# Patient Record
Sex: FEMALE | Race: WHITE | Employment: FULL TIME | URBAN - METROPOLITAN AREA
[De-identification: names, ages, dates, MRNs, and addresses within clinical notes are randomized per-mention and may not be internally consistent; named-entity substitution may affect disease eponyms.]

---

## 2017-02-21 ENCOUNTER — HOSPITAL ENCOUNTER (OUTPATIENT)
Dept: MAMMOGRAPHY | Age: 47
Discharge: HOME | End: 2017-02-21
Attending: FAMILY MEDICINE
Payer: COMMERCIAL

## 2017-02-21 DIAGNOSIS — Z12.31 VISIT FOR SCREENING MAMMOGRAM: ICD-10-CM

## 2017-02-21 PROCEDURE — 77067 SCR MAMMO BI INCL CAD: CPT

## 2017-05-12 ENCOUNTER — APPOINTMENT (RX ONLY)
Dept: URBAN - METROPOLITAN AREA CLINIC 349 | Facility: CLINIC | Age: 47
Setting detail: DERMATOLOGY
End: 2017-05-12

## 2017-05-12 DIAGNOSIS — B37.2 CANDIDIASIS OF SKIN AND NAIL: ICD-10-CM

## 2017-05-12 DIAGNOSIS — Z79.899 OTHER LONG TERM (CURRENT) DRUG THERAPY: ICD-10-CM

## 2017-05-12 PROCEDURE — ? OTHER

## 2017-05-12 PROCEDURE — ? PRESCRIPTION

## 2017-05-12 PROCEDURE — 99202 OFFICE O/P NEW SF 15 MIN: CPT

## 2017-05-12 PROCEDURE — ? TREATMENT REGIMEN

## 2017-05-12 PROCEDURE — ? COUNSELING

## 2017-05-12 RX ORDER — IODOQUINOL, HYDROCORTISONE ACETATE AND ALOE VERA LEAF 10; 20; 10 MG/G; MG/G; MG/G
GEL TOPICAL
Qty: 1 | Refills: 2 | Status: ERX | COMMUNITY
Start: 2017-05-12

## 2017-05-12 RX ORDER — FLUCONAZOLE 150 MG/1
TABLET ORAL
Qty: 7 | Refills: 0 | Status: ERX | COMMUNITY
Start: 2017-05-12

## 2017-05-12 RX ADMIN — IODOQUINOL, HYDROCORTISONE ACETATE AND ALOE VERA LEAF: 10; 20; 10 GEL TOPICAL at 00:00

## 2017-05-12 RX ADMIN — FLUCONAZOLE: 150 TABLET ORAL at 00:00

## 2017-05-12 ASSESSMENT — LOCATION DETAILED DESCRIPTION DERM
LOCATION DETAILED: LEFT SUPRAPUBIC SKIN
LOCATION DETAILED: MONS PUBIS
LOCATION DETAILED: RIGHT SUPRAPUBIC SKIN
LOCATION DETAILED: LEFT ANTERIOR PROXIMAL THIGH

## 2017-05-12 ASSESSMENT — LOCATION ZONE DERM
LOCATION ZONE: VULVA
LOCATION ZONE: LEG
LOCATION ZONE: TRUNK

## 2017-05-12 ASSESSMENT — LOCATION SIMPLE DESCRIPTION DERM
LOCATION SIMPLE: LEFT THIGH
LOCATION SIMPLE: GROIN

## 2017-05-12 NOTE — PROCEDURE: TREATMENT REGIMEN
Otc Regimen: Zeasorb AF Powder apply to affected area twice daily as needed
Detail Level: Detailed
Samples Given: Alcortin gel apply to affected area twice daily

## 2017-05-12 NOTE — PROCEDURE: OTHER
Other (Free Text): Photo taken today to track rash over time.
Detail Level: Detailed
Note Text (......Xxx Chief Complaint.): This diagnosis correlates with the

## 2017-09-20 PROBLEM — M85.80 OSTEOPENIA: Status: ACTIVE | Noted: 2017-09-20

## 2018-03-20 ENCOUNTER — HOSPITAL ENCOUNTER (OUTPATIENT)
Dept: MAMMOGRAPHY | Age: 48
Discharge: HOME | End: 2018-03-20
Payer: COMMERCIAL

## 2018-03-20 DIAGNOSIS — Z12.31 VISIT FOR SCREENING MAMMOGRAM: ICD-10-CM

## 2018-03-20 PROCEDURE — 77067 SCR MAMMO BI INCL CAD: CPT

## 2019-06-27 ENCOUNTER — HOSPITAL ENCOUNTER (OUTPATIENT)
Dept: MAMMOGRAPHY | Age: 49
Discharge: HOME | End: 2019-06-27
Attending: NURSE PRACTITIONER
Payer: COMMERCIAL

## 2019-06-27 DIAGNOSIS — Z12.39 BREAST SCREENING, UNSPECIFIED: ICD-10-CM

## 2019-06-27 PROCEDURE — 77067 SCR MAMMO BI INCL CAD: CPT

## 2020-09-23 ENCOUNTER — HOSPITAL ENCOUNTER (OUTPATIENT)
Dept: MAMMOGRAPHY | Age: 50
Discharge: HOME | End: 2020-09-23
Attending: NURSE PRACTITIONER
Payer: COMMERCIAL

## 2020-09-23 DIAGNOSIS — Z12.31 SCREENING MAMMOGRAM FOR HIGH-RISK PATIENT: ICD-10-CM

## 2020-09-23 PROCEDURE — 77067 SCR MAMMO BI INCL CAD: CPT

## 2021-08-31 ENCOUNTER — TRANSCRIBE ORDER (OUTPATIENT)
Dept: SCHEDULING | Age: 51
End: 2021-08-31

## 2021-08-31 DIAGNOSIS — Z12.31 VISIT FOR SCREENING MAMMOGRAM: Primary | ICD-10-CM

## 2021-11-19 ENCOUNTER — HOSPITAL ENCOUNTER (OUTPATIENT)
Dept: MAMMOGRAPHY | Age: 51
Discharge: HOME | End: 2021-11-19
Attending: NURSE PRACTITIONER
Payer: COMMERCIAL

## 2021-11-19 DIAGNOSIS — Z12.31 VISIT FOR SCREENING MAMMOGRAM: ICD-10-CM

## 2021-11-19 PROCEDURE — 77063 BREAST TOMOSYNTHESIS BI: CPT

## 2022-02-18 PROBLEM — H04.123 DRY EYE SYNDROME OF BOTH EYES: Status: ACTIVE | Noted: 2020-11-24

## 2022-02-18 PROBLEM — E55.9 VITAMIN D DEFICIENCY: Status: ACTIVE | Noted: 2022-02-18

## 2022-02-18 PROBLEM — E05.00 GRAVES DISEASE: Status: ACTIVE | Noted: 2022-02-18

## 2022-03-18 PROBLEM — E55.9 VITAMIN D DEFICIENCY: Status: ACTIVE | Noted: 2022-02-18

## 2022-03-18 PROBLEM — E05.00 GRAVES DISEASE: Status: ACTIVE | Noted: 2022-02-18

## 2022-03-19 PROBLEM — M85.80 OSTEOPENIA: Status: ACTIVE | Noted: 2017-09-20

## 2022-03-19 PROBLEM — H04.123 DRY EYE SYNDROME OF BOTH EYES: Status: ACTIVE | Noted: 2020-11-24

## 2022-05-25 ENCOUNTER — APPOINTMENT (RX ONLY)
Dept: URBAN - METROPOLITAN AREA CLINIC 329 | Facility: CLINIC | Age: 52
Setting detail: DERMATOLOGY
End: 2022-05-25

## 2022-05-25 DIAGNOSIS — L57.8 OTHER SKIN CHANGES DUE TO CHRONIC EXPOSURE TO NONIONIZING RADIATION: ICD-10-CM

## 2022-05-25 DIAGNOSIS — D18.0 HEMANGIOMA: ICD-10-CM

## 2022-05-25 DIAGNOSIS — D22 MELANOCYTIC NEVI: ICD-10-CM

## 2022-05-25 DIAGNOSIS — Z71.89 OTHER SPECIFIED COUNSELING: ICD-10-CM

## 2022-05-25 PROBLEM — D18.01 HEMANGIOMA OF SKIN AND SUBCUTANEOUS TISSUE: Status: ACTIVE | Noted: 2022-05-25

## 2022-05-25 PROBLEM — D22.5 MELANOCYTIC NEVI OF TRUNK: Status: ACTIVE | Noted: 2022-05-25

## 2022-05-25 PROCEDURE — ? OTHER

## 2022-05-25 PROCEDURE — 99203 OFFICE O/P NEW LOW 30 MIN: CPT

## 2022-05-25 PROCEDURE — ? BODY PHOTOGRAPHY

## 2022-05-25 PROCEDURE — ? FULL BODY SKIN EXAM

## 2022-05-25 PROCEDURE — ? TREATMENT REGIMEN

## 2022-05-25 PROCEDURE — ? EDUCATIONAL RESOURCES PROVIDED

## 2022-05-25 PROCEDURE — ? COUNSELING

## 2022-05-25 ASSESSMENT — LOCATION DETAILED DESCRIPTION DERM
LOCATION DETAILED: LEFT MEDIAL UPPER BACK
LOCATION DETAILED: LEFT ANTERIOR PROXIMAL THIGH
LOCATION DETAILED: INFERIOR THORACIC SPINE

## 2022-05-25 ASSESSMENT — LOCATION SIMPLE DESCRIPTION DERM
LOCATION SIMPLE: UPPER BACK
LOCATION SIMPLE: LEFT THIGH
LOCATION SIMPLE: LEFT UPPER BACK

## 2022-05-25 ASSESSMENT — LOCATION ZONE DERM
LOCATION ZONE: LEG
LOCATION ZONE: TRUNK

## 2022-05-25 NOTE — PROCEDURE: BODY PHOTOGRAPHY
Reason For Photography: The patient is obtaining whole body photography to observe existing suspicious moles and or monitor for the appearance of any new lesions.
Whole Body Statement: The whole body was photographed today.
Consent was obtained for whole body photography. Patient understands that photograph costs may not be covered by insurance.
Number Of Photographs (Optional- Will Not Render If 0): 2
Detail Level: Detailed
Was The Entire Body Photographed (Cannot Bill Unless Entire Body Photographed)?: Please Select Yes or No - If you select Yes you are confirming that you took full body photographs

## 2022-11-25 ENCOUNTER — HOSPITAL ENCOUNTER (OUTPATIENT)
Dept: MAMMOGRAPHY | Age: 52
Discharge: HOME | End: 2022-11-28
Payer: COMMERCIAL

## 2022-11-25 DIAGNOSIS — Z12.31 ENCOUNTER FOR SCREENING MAMMOGRAM FOR MALIGNANT NEOPLASM OF BREAST: ICD-10-CM

## 2022-11-25 PROCEDURE — 77067 SCR MAMMO BI INCL CAD: CPT

## 2022-11-30 ENCOUNTER — HOSPITAL ENCOUNTER (OUTPATIENT)
Dept: MAMMOGRAPHY | Age: 52
Discharge: HOME | End: 2022-12-03
Payer: COMMERCIAL

## 2022-11-30 DIAGNOSIS — M85.80 OSTEOPENIA, UNSPECIFIED LOCATION: ICD-10-CM

## 2022-11-30 PROCEDURE — 77080 DXA BONE DENSITY AXIAL: CPT

## 2023-01-09 ENCOUNTER — OFFICE VISIT (OUTPATIENT)
Dept: ENDOCRINOLOGY | Age: 53
End: 2023-01-09

## 2023-01-09 VITALS
OXYGEN SATURATION: 94 % | SYSTOLIC BLOOD PRESSURE: 134 MMHG | HEIGHT: 62 IN | BODY MASS INDEX: 34.41 KG/M2 | HEART RATE: 85 BPM | DIASTOLIC BLOOD PRESSURE: 86 MMHG | WEIGHT: 187 LBS

## 2023-01-09 DIAGNOSIS — E04.2 MULTINODULAR GOITER: ICD-10-CM

## 2023-01-09 DIAGNOSIS — E05.90 HYPERTHYROIDISM: Primary | ICD-10-CM

## 2023-01-09 DIAGNOSIS — E05.90 HYPERTHYROIDISM: ICD-10-CM

## 2023-01-09 DIAGNOSIS — M85.89 OSTEOPENIA OF MULTIPLE SITES: ICD-10-CM

## 2023-01-09 RX ORDER — CALCIUM CARBONATE 200(500)MG
1 TABLET,CHEWABLE ORAL DAILY
COMMUNITY

## 2023-01-09 ASSESSMENT — ENCOUNTER SYMPTOMS
ROS SKIN COMMENTS: DENIES HAIR LOSS, DRY SKIN.
DIARRHEA: 0
CONSTIPATION: 0

## 2023-01-09 NOTE — PROGRESS NOTES
Juan Colindres MD, [facility]  [address], [address] [facility], [address] [place] [address] [place]  Phone [phone]  Facsimile [phone]          Jai Magallanes is a 47 y.o. female seen 1/9/2023 at the request of Galindo Browen NP for the evaluation of hyperthyroidism and osteopenia        ASSESSMENT AND PLAN:    1. Hyperthyroidism  She was diagnosed with hyperthyroidism in 2009. She reports being told that she has Graves' disease in the setting of a hot and cold nodule. She states that she was briefly treated with methimazole but discontinued it because it made her feel poorly. Her hyperthyroidism has therefore been untreated for greater than a decade. 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 Since these nodules take up the majority of the thyroid gland, the appearance may be similar to Graves' disease on a thyroid scan. I will check her thyroid function tests today, including thyroid-stimulating immunoglobulins. We discussed that untreated hyperthyroidism increases the risk for atrial tachyarrhythmias and worsening bone loss. She is adamant that she does not want undergo radioactive iodine ablation or total thyroidectomy. I will therefore likely start her on medical therapy with methimazole once I have reviewed her lab results. Follow-up in 3 months. 2. Multinodular goiter  She reports undergoing a benign FNA biopsy of the right lobe nodule in 2009. Thyroid ultrasound performed today revealed that the right lobe nodule had enlarged significantly compared to the prior study. The left nodule was stable in size. I therefore performed an FNA biopsy of the right lobe nodule today. The specimen will be sent to [facility]  if needed.     3. Osteopenia of multiple sites  Bone density 11/2022 revealed stable osteopenia with low fracture risk. We discussed that ongoing untreated hyperthyroidism will likely lead to increased bone loss, especially when she is postmenopausal.              Procedures:    Thyroid ultrasound 1/9/2023: Right lobe 1.89 x 2.24 x 4.24 cm. The majority of the right lobe is occupied by a complex, predominantly solid isoechoic nodule measuring 1.61 x 2.10 x 3.03 cm containing punctate echogenic foci and macrocalcifications causing posterior acoustic shadowing (TR 5). Isthmus measures 0.19 cm. Left lobe 1.89 x 1.97 x 4.76 cm. The majority of the left lobe is occupied by a predominantly solid isoechoic nodule measuring 1.61 x 1.94 x 3.57 cm containing punctate echogenic foci (TR 4). Thyroid FNA Biopsy Procedure Note:    Informed consent was obtained from the patient. I explained the small risk of bleeding and infection. A timeout was performed. The neck was cleansed using alcohol pads. The skin was anesthetized using 1% lidocaine. 5 passes with a 27-gauge needle were made into the right lobe nodule using ultrasound-guidance. The needle was visualized in the nodule on all attempts. The material from 3 passes was placed in CytoLyt solution and 2 passes into the [employer] [address] tube. The patient tolerated the procedure well. Post-procedure ultrasound revealed no evidence of swelling or hemorrhage. The biopsy site was covered with a bandaid. The patient was advised to call with swelling, dysphagia, excessive bruising or severe neck pain. Follow-up and Dispositions    Return in about 3 months (around 4/9/2023), or Friday afternoon is okay. HISTORY OF PRESENT ILLNESS:    THYROID DISEASE    Presentation/Diagnosis: Abnormal thyroid function tests noted on routine lab screen in 11/2022. She has a history of hyperthyroidism following hysterectomy in 2009. She saw Dr. Preston Vasquez and was diagnosed with Graves' disease and a \"hot and cold nodule. \"  She states that she took methimazole for 1-2 months but stopped it because she did not feel well. She reports undergoing a benign FNA biopsy with Dr. Preston Vasquez. Symptoms: See review of systems. Treatment: None. Imaging:   Thyroid ultrasound 2/18/2013 [facility]): Right lobe 4.3 x 1.7 x 1.8 cm, left lobe 4.0 x 1.9 x 2.3 cm, not significantly changed from the prior study in 4/2009. There is a dominant mass at the midpole of the right lobe measuring 2.3 x 1.2 x 1.7 cm (2.2 x 1.2 x 1.4 cm in 2009). There is a dominant left lobe mass measuring 3.6 x 1.7 x 2.2 cm (3.2 x 1.5 x 2.2 cm in 2009). Minimal change bilaterally favors benign   etiologies. Decreased uptake at the right lower pole noted on scintigraphy in 2009 could be associated with relative hyperfunctioning of the large right midpole nodule. The isthmus appears normal at 2 mm. Thyroid ultrasound 1/9/2023: Right lobe 1.89 x 2.24 x 4.24 cm. The majority of the right lobe is occupied by a complex, predominantly solid isoechoic nodule measuring 1.61 x 2.10 x 3.03 cm containing punctate echogenic foci and macrocalcifications causing posterior acoustic shadowing (TR 5). Isthmus measures 0.19 cm. Left lobe 1.89 x 1.97 x 4.76 cm. The majority of the left lobe is occupied by a predominantly solid isoechoic nodule measuring 1.61 x 1.94 x 3.57 cm containing punctate echogenic foci (TR 4). Labs:  2/14/2013: TSH 0.005, free T4 2.05.  1/30/2014: Total T4 8.4.  11/22/2022: TSH <0.01, free T4 1.9. Pregnancy status: Status post hysterectomy. METABOLIC BONE DISEASE    Presentation/Diagnosis: Osteopenia noted on screening bone density in 2013. Fracture History: None. Treatment: None. Contraindications to Treatment: No history of skeletal radiation or blood clots. Imaging:  DXA 11/30/2022: Lumbar spine BMD 1.075, T score -1.0, Z score -1.4; left femoral neck BMD 0.782, T score -1.8, Z score -1.6; left one third radius BMD 0.929, T score 0.6, Z score 0.7.     Labs:  11/22/2022: 25-OH vitamin D 46. Review of Systems   Constitutional:  Negative for diaphoresis and fatigue. Weight decreased 25 pounds since 8/2022 through [employer]. Eyes:         No eye complaints except dry eyes (Restasis helps). Cardiovascular:  Negative for palpitations. Gastrointestinal:  Negative for constipation and diarrhea. Endocrine: Negative for cold intolerance and heat intolerance. Skin:         Denies hair loss, dry skin. Neurological:  Negative for tremors. Psychiatric/Behavioral:  Negative for sleep disturbance. Vital Signs:  /86   Pulse 85   Ht 5' 2\" (1.575 m)   Wt 187 lb (84.8 kg)   SpO2 94%   BMI 34.20 kg/m²     Physical Exam  Constitutional:       General: She is not in acute distress. Neck:      Comments: Bilateral palpable thyroid nodules. Cardiovascular:      Rate and Rhythm: Normal rate and regular rhythm. Lymphadenopathy:      Cervical: No cervical adenopathy. Neurological:      Motor: No tremor.          Orders Placed This Encounter   Procedures    TSH     Standing Status:   Future     Standing Expiration Date:   1/9/2024    T4, Free     Standing Status:   Future     Standing Expiration Date:   1/9/2024    T3, Free     Standing Status:   Future     Standing Expiration Date:   1/9/2024    Thyroid Stimulating Immunoglobulin     Standing Status:   Future     Standing Expiration Date:   1/9/2024    TSH     Standing Status:   Future     Standing Expiration Date:   1/9/2024    T4, Free     Standing Status:   Future     Standing Expiration Date:   1/9/2024    T3, Free     Standing Status:   Future     Standing Expiration Date:   1/9/2024    Hepatic Function Panel     Standing Status:   Future     Standing Expiration Date:   1/9/2024    CHG US SOFT TISSUE HEAD & NECK REAL TIME IMGE DOCM         Current Outpatient Medications   Medication Sig Dispense Refill    Multiple Vitamin (MULTIVITAMIN PO) Take by mouth      calcium carbonate (CALCIUM ANTACID) 500 MG chewable tablet Take 1 tablet by mouth daily      Coenzyme Q10 (CO Q-10 PO) Take by mouth      cycloSPORINE (RESTASIS) 0.05 % ophthalmic emulsion INSTILL 1 DROP IN OU BID. No current facility-administered medications for this visit.

## 2023-01-10 LAB
T4 FREE SERPL-MCNC: 1.8 NG/DL (ref 0.78–1.46)
TSH, 3RD GENERATION: <0.005 UIU/ML (ref 0.36–3.74)

## 2023-01-11 ENCOUNTER — TELEPHONE (OUTPATIENT)
Dept: ENDOCRINOLOGY | Age: 53
End: 2023-01-11

## 2023-01-11 DIAGNOSIS — E05.90 HYPERTHYROIDISM: Primary | ICD-10-CM

## 2023-01-11 PROBLEM — E05.00 GRAVES DISEASE: Status: RESOLVED | Noted: 2022-02-18 | Resolved: 2023-01-11

## 2023-01-11 LAB
T3FREE SERPL-MCNC: 6.8 PG/ML (ref 2–4.4)
TSI ACT/NOR SER: <0.1 IU/L (ref 0–0.55)

## 2023-01-11 RX ORDER — METHIMAZOLE 5 MG/1
5 TABLET ORAL DAILY
Qty: 30 TABLET | Refills: 5 | Status: SHIPPED | OUTPATIENT
Start: 2023-01-11

## 2023-01-11 NOTE — TELEPHONE ENCOUNTER
Her Graves' disease antibodies were negative, and I do not think she has Graves' disease. Her thyroid is overactive and I would recommend that she restart methimazole. I would normally use a higher dose, but I will start her on a lower dose based on our discussion.

## 2023-01-12 ENCOUNTER — TELEPHONE (OUTPATIENT)
Dept: ENDOCRINOLOGY | Age: 53
End: 2023-01-12

## 2023-03-29 ENCOUNTER — OFFICE VISIT (OUTPATIENT)
Dept: OCCUPATIONAL MEDICINE | Age: 53
End: 2023-03-29

## 2023-03-29 VITALS
OXYGEN SATURATION: 98 % | SYSTOLIC BLOOD PRESSURE: 146 MMHG | RESPIRATION RATE: 16 BRPM | TEMPERATURE: 98.4 F | HEART RATE: 94 BPM | DIASTOLIC BLOOD PRESSURE: 82 MMHG

## 2023-03-29 DIAGNOSIS — R09.82 POSTNASAL DRIP: Primary | ICD-10-CM

## 2023-03-29 PROBLEM — M85.80 OSTEOPENIA: Status: ACTIVE | Noted: 2017-09-20

## 2023-03-29 ASSESSMENT — ENCOUNTER SYMPTOMS
VOMITING: 0
RHINORRHEA: 0
ABDOMINAL PAIN: 0
EYE REDNESS: 0
DIARRHEA: 0
EYE DISCHARGE: 0
COUGH: 0
SORE THROAT: 0
SINUS PRESSURE: 0
SINUS PAIN: 0
VOICE CHANGE: 1
EYE ITCHING: 0
EYE PAIN: 0

## 2023-03-29 NOTE — PROGRESS NOTES
PROGRESS NOTE    SUBJECTIVE:   Jay Tolbert is a 47 y.o. female seen for hoarseness and ear feeling \"clogged\" for past several days. Denies any known or suspected sick contacts    Chief Complaint    Otalgia         Otalgia   There is pain in both ears. This is a new problem. The current episode started in the past 7 days. The problem has been waxing and waning. There has been no fever. The pain is moderate. Pertinent negatives include no abdominal pain, coughing, diarrhea, ear discharge, headaches, hearing loss, neck pain, rash, rhinorrhea, sore throat or vomiting. Associated symptoms comments: Hoarseness. Treatments tried: Claritin D. The treatment provided no relief. Current Outpatient Medications   Medication Sig Dispense Refill    methIMAzole (TAPAZOLE) 5 MG tablet Take 1 tablet by mouth daily 30 tablet 5    Multiple Vitamin (MULTIVITAMIN PO) Take by mouth      calcium carbonate (CALCIUM ANTACID) 500 MG chewable tablet Take 1 tablet by mouth daily      Coenzyme Q10 (CO Q-10 PO) Take by mouth      cycloSPORINE (RESTASIS) 0.05 % ophthalmic emulsion INSTILL 1 DROP IN OU BID. No current facility-administered medications for this visit. Allergies   Allergen Reactions    Lubricants Other (See Comments)     KY jelly and other lubricants  Other reaction(s): Contact Dermatitis  KY jelly and other lubricants       Social History     Tobacco Use    Smoking status: Never    Smokeless tobacco: Never   Substance Use Topics    Alcohol use: Yes        Review of Systems   Constitutional:  Negative for chills, fatigue and fever. HENT:  Positive for ear pain, postnasal drip and voice change. Negative for ear discharge, hearing loss, rhinorrhea, sinus pressure, sinus pain, sneezing and sore throat. Eyes:  Negative for pain, discharge, redness and itching. Respiratory:  Negative for cough. Gastrointestinal:  Negative for abdominal pain, diarrhea and vomiting.    Musculoskeletal:  Negative for

## 2023-05-25 ENCOUNTER — APPOINTMENT (RX ONLY)
Dept: URBAN - METROPOLITAN AREA CLINIC 330 | Facility: CLINIC | Age: 53
Setting detail: DERMATOLOGY
End: 2023-05-25

## 2023-05-25 DIAGNOSIS — D22 MELANOCYTIC NEVI: ICD-10-CM | Status: STABLE

## 2023-05-25 DIAGNOSIS — L60.3 NAIL DYSTROPHY: ICD-10-CM

## 2023-05-25 PROBLEM — D22.5 MELANOCYTIC NEVI OF TRUNK: Status: ACTIVE | Noted: 2023-05-25

## 2023-05-25 PROCEDURE — ? TREATMENT REGIMEN

## 2023-05-25 PROCEDURE — 99213 OFFICE O/P EST LOW 20 MIN: CPT

## 2023-05-25 PROCEDURE — ? OTHER

## 2023-05-25 PROCEDURE — ? COUNSELING

## 2023-05-25 PROCEDURE — ? MEDICAL PHOTOGRAPHY REVIEW

## 2023-05-25 PROCEDURE — ? FULL BODY SKIN EXAM

## 2023-05-25 ASSESSMENT — LOCATION DETAILED DESCRIPTION DERM
LOCATION DETAILED: LEFT THUMBNAIL
LOCATION DETAILED: INFERIOR THORACIC SPINE
LOCATION DETAILED: RIGHT THUMBNAIL

## 2023-05-25 ASSESSMENT — LOCATION ZONE DERM
LOCATION ZONE: FINGERNAIL
LOCATION ZONE: TRUNK

## 2023-05-25 ASSESSMENT — LOCATION SIMPLE DESCRIPTION DERM
LOCATION SIMPLE: UPPER BACK
LOCATION SIMPLE: LEFT THUMBNAIL
LOCATION SIMPLE: RIGHT THUMBNAIL

## 2023-05-25 NOTE — PROCEDURE: MIPS QUALITY
Detail Level: Detailed
Quality 402: Tobacco Use And Help With Quitting Among Adolescents: Patient screened for tobacco and never smoked
Quality 226: Preventive Care And Screening: Tobacco Use: Screening And Cessation Intervention: Patient screened for tobacco use and is an ex/non-smoker
Quality 130: Documentation Of Current Medications In The Medical Record: Current Medications Documented
Quality 431: Preventive Care And Screening: Unhealthy Alcohol Use - Screening: Patient not identified as an unhealthy alcohol user when screened for unhealthy alcohol use using a systematic screening method

## 2023-11-30 ENCOUNTER — TRANSCRIBE ORDERS (OUTPATIENT)
Dept: SCHEDULING | Age: 53
End: 2023-11-30

## 2023-11-30 DIAGNOSIS — Z12.31 ENCOUNTER FOR SCREENING MAMMOGRAM FOR MALIGNANT NEOPLASM OF BREAST: Primary | ICD-10-CM

## 2024-01-08 ENCOUNTER — HOSPITAL ENCOUNTER (OUTPATIENT)
Dept: MAMMOGRAPHY | Age: 54
Discharge: HOME | End: 2024-01-11
Payer: COMMERCIAL

## 2024-01-08 VITALS — BODY MASS INDEX: 33.31 KG/M2 | WEIGHT: 181 LBS | HEIGHT: 62 IN

## 2024-01-08 DIAGNOSIS — Z12.31 ENCOUNTER FOR SCREENING MAMMOGRAM FOR MALIGNANT NEOPLASM OF BREAST: ICD-10-CM

## 2024-01-08 PROCEDURE — 77063 BREAST TOMOSYNTHESIS BI: CPT

## 2024-03-19 ENCOUNTER — OFFICE VISIT (OUTPATIENT)
Age: 54
End: 2024-03-19

## 2024-03-19 VITALS
RESPIRATION RATE: 16 BRPM | OXYGEN SATURATION: 99 % | HEART RATE: 71 BPM | TEMPERATURE: 97.2 F | SYSTOLIC BLOOD PRESSURE: 148 MMHG | DIASTOLIC BLOOD PRESSURE: 88 MMHG

## 2024-03-19 DIAGNOSIS — H61.21 IMPACTED CERUMEN OF RIGHT EAR: Primary | ICD-10-CM

## 2024-03-19 PROBLEM — L60.3: Status: ACTIVE | Noted: 2023-06-30

## 2024-03-19 ASSESSMENT — ENCOUNTER SYMPTOMS
EYE REDNESS: 0
EYE ITCHING: 0
RHINORRHEA: 0
COUGH: 0
SORE THROAT: 0
DIARRHEA: 0
SINUS PRESSURE: 0
EYE PAIN: 0
EYE DISCHARGE: 0
SINUS PAIN: 0
ABDOMINAL PAIN: 0
NAUSEA: 0
VOMITING: 0

## 2024-03-19 NOTE — PROGRESS NOTES
Negative for chest pain.   Gastrointestinal:  Negative for abdominal pain, diarrhea, nausea and vomiting.   Musculoskeletal:  Negative for myalgias and neck pain.   Skin:  Negative for rash.   Neurological:  Negative for dizziness, light-headedness and headaches.          OBJECTIVE:  BP (!) 148/88 (Site: Left Upper Arm, Position: Sitting, Cuff Size: Large Adult)   Pulse 71   Temp 97.2 °F (36.2 °C) (Oral)   Resp 16   SpO2 99%      No results found for this visit on 03/19/24.    Physical Exam  Vitals reviewed.   Constitutional:       General: She is awake. She is not in acute distress.     Appearance: Normal appearance. She is well-developed and well-groomed.   HENT:      Head: Normocephalic.      Right Ear: External ear normal. Decreased hearing noted. No laceration, drainage, swelling or tenderness. No middle ear effusion. There is impacted cerumen. No foreign body. No mastoid tenderness. Tympanic membrane is not injected, erythematous, retracted or bulging.      Left Ear: Hearing, tympanic membrane, ear canal and external ear normal.      Ears:      Comments: Cerumen impaction to right ear canal. Ear lavage performed successfully, patient tolerated procedure well  Eyes:      Pupils: Pupils are equal, round, and reactive to light. Pupils are equal.      Right eye: Pupil is not sluggish.      Left eye: Pupil is not sluggish.   Cardiovascular:      Rate and Rhythm: Normal rate and regular rhythm.      Heart sounds: Normal heart sounds.   Pulmonary:      Effort: Pulmonary effort is normal.   Neurological:      Mental Status: She is alert and oriented to person, place, and time.      Motor: No weakness.      Coordination: Coordination is intact. Coordination normal.      Gait: Gait is intact. Gait normal.   Psychiatric:         Behavior: Behavior is cooperative.         ASSESSMENT and PLAN    Lili was seen today for ear fullness.    Diagnoses and all orders for this visit:    Impacted cerumen of right

## 2024-05-22 ENCOUNTER — APPOINTMENT (RX ONLY)
Dept: URBAN - METROPOLITAN AREA CLINIC 330 | Facility: CLINIC | Age: 54
Setting detail: DERMATOLOGY
End: 2024-05-22

## 2024-05-22 DIAGNOSIS — D22 MELANOCYTIC NEVI: ICD-10-CM | Status: STABLE

## 2024-05-22 DIAGNOSIS — L57.8 OTHER SKIN CHANGES DUE TO CHRONIC EXPOSURE TO NONIONIZING RADIATION: ICD-10-CM

## 2024-05-22 PROBLEM — D22.5 MELANOCYTIC NEVI OF TRUNK: Status: ACTIVE | Noted: 2024-05-22

## 2024-05-22 PROBLEM — D22.71 MELANOCYTIC NEVI OF RIGHT LOWER LIMB, INCLUDING HIP: Status: ACTIVE | Noted: 2024-05-22

## 2024-05-22 PROCEDURE — ? TREATMENT REGIMEN

## 2024-05-22 PROCEDURE — ? FULL BODY SKIN EXAM

## 2024-05-22 PROCEDURE — ? MEDICAL PHOTOGRAPHY REVIEW

## 2024-05-22 PROCEDURE — ? OBSERVATION

## 2024-05-22 PROCEDURE — ? COUNSELING

## 2024-05-22 PROCEDURE — 99213 OFFICE O/P EST LOW 20 MIN: CPT

## 2024-05-22 PROCEDURE — ? ADDITIONAL NOTES

## 2024-05-22 ASSESSMENT — LOCATION ZONE DERM
LOCATION ZONE: TRUNK
LOCATION ZONE: LEG

## 2024-05-22 ASSESSMENT — LOCATION SIMPLE DESCRIPTION DERM
LOCATION SIMPLE: UPPER BACK
LOCATION SIMPLE: RIGHT THIGH

## 2024-05-22 ASSESSMENT — LOCATION DETAILED DESCRIPTION DERM
LOCATION DETAILED: RIGHT ANTERIOR PROXIMAL THIGH
LOCATION DETAILED: INFERIOR THORACIC SPINE
LOCATION DETAILED: SUPERIOR THORACIC SPINE

## 2024-05-22 NOTE — PROCEDURE: ADDITIONAL NOTES
Detail Level: Simple
Render Risk Assessment In Note?: no
Additional Notes: Offered biopsy today vs. observation. Pt declines biopsy today and chooses to monitor at this time.

## 2024-12-16 DIAGNOSIS — Z12.31 VISIT FOR SCREENING MAMMOGRAM: Primary | ICD-10-CM

## 2025-01-31 ENCOUNTER — HOSPITAL ENCOUNTER (OUTPATIENT)
Dept: MAMMOGRAPHY | Age: 55
Discharge: HOME | End: 2025-01-31
Payer: COMMERCIAL

## 2025-01-31 DIAGNOSIS — Z12.31 VISIT FOR SCREENING MAMMOGRAM: ICD-10-CM

## 2025-01-31 PROCEDURE — 77063 BREAST TOMOSYNTHESIS BI: CPT

## 2025-06-02 ENCOUNTER — OFFICE VISIT (OUTPATIENT)
Dept: ENDOCRINOLOGY | Age: 55
End: 2025-06-02
Payer: COMMERCIAL

## 2025-06-02 VITALS
RESPIRATION RATE: 14 BRPM | BODY MASS INDEX: 35.15 KG/M2 | WEIGHT: 191 LBS | HEIGHT: 62 IN | DIASTOLIC BLOOD PRESSURE: 92 MMHG | SYSTOLIC BLOOD PRESSURE: 144 MMHG | OXYGEN SATURATION: 98 % | HEART RATE: 78 BPM

## 2025-06-02 DIAGNOSIS — E05.90 HYPERTHYROIDISM: Primary | ICD-10-CM

## 2025-06-02 DIAGNOSIS — M85.89 OSTEOPENIA OF MULTIPLE SITES: ICD-10-CM

## 2025-06-02 DIAGNOSIS — E04.2 MULTINODULAR GOITER: ICD-10-CM

## 2025-06-02 PROCEDURE — 99214 OFFICE O/P EST MOD 30 MIN: CPT | Performed by: INTERNAL MEDICINE

## 2025-06-02 PROCEDURE — 76536 US EXAM OF HEAD AND NECK: CPT | Performed by: INTERNAL MEDICINE

## 2025-06-02 RX ORDER — ESTRADIOL 0.05 MG/D
1 PATCH, EXTENDED RELEASE TRANSDERMAL
COMMUNITY

## 2025-06-02 ASSESSMENT — ENCOUNTER SYMPTOMS
DIARRHEA: 0
CONSTIPATION: 0
SHORTNESS OF BREATH: 0
TROUBLE SWALLOWING: 1
VOICE CHANGE: 0

## 2025-06-02 NOTE — PROGRESS NOTES
Juan Ramirez MD, [facility]  [address] [place] [address], [address]  [place] [address]  Phone [phone]  Facsimile [phone]          Lili Brown is a 55 y.o. female seen 6/2/2025 for follow up of hyperthyroidism and multinodular goiter (she has not been here since 1/2023)        ASSESSMENT AND PLAN:    1. Hyperthyroidism  She was diagnosed with hyperthyroidism in 2009.  She reports being told that she has Graves' disease in the setting of a hot and cold nodule.  She states that she was briefly treated with methimazole but discontinued it because it made her feel poorly.  I prescribed methimazole again at her initial visit in 1/2023 but she never took it.  Her hyperthyroidism has therefore been untreated for greater than a decade.  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  Since these nodules take up the majority of the thyroid gland, the appearance may be similar to Graves' disease on a thyroid scan.  Thyroid-stimulating immunoglobulins were negative in the past, making Graves' disease unlikely.  I will check her thyroid function tests today.  We again discussed that untreated hyperthyroidism increases the risk for atrial tachyarrhythmias and worsening bone loss.  Given the natural history of toxic multinodular goiter, I would favor more definitive therapy with either surgery or radioactive iodine.  She is having some mild compressive symptoms related to the nodules, and I would therefore favor a total thyroidectomy.  Once I have reviewed her labs, I will start her on an appropriate dose of methimazole.  I will have her follow-up in 3 months with labs prior to visit.  We will again discuss definitive therapy at her follow-up visit.      2. Multinodular goiter  She reports undergoing a benign FNA biopsy of the right lobe nodule in 2009.  Thyroid ultrasound

## 2025-07-07 ENCOUNTER — TELEPHONE (OUTPATIENT)
Dept: ENDOCRINOLOGY | Age: 55
End: 2025-07-07

## 2025-07-07 DIAGNOSIS — E05.90 HYPERTHYROIDISM: Primary | ICD-10-CM
